# Patient Record
Sex: MALE | Race: WHITE | NOT HISPANIC OR LATINO | Employment: UNEMPLOYED | URBAN - METROPOLITAN AREA
[De-identification: names, ages, dates, MRNs, and addresses within clinical notes are randomized per-mention and may not be internally consistent; named-entity substitution may affect disease eponyms.]

---

## 2017-11-05 PROBLEM — I10 ESSENTIAL HYPERTENSION: Status: ACTIVE | Noted: 2017-11-05

## 2017-11-05 PROBLEM — Z00.00 HEALTHCARE MAINTENANCE: Status: ACTIVE | Noted: 2017-11-05

## 2018-02-05 PROBLEM — Z00.00 HEALTHCARE MAINTENANCE: Status: RESOLVED | Noted: 2017-11-05 | Resolved: 2018-02-05

## 2018-08-06 ENCOUNTER — TELEPHONE (OUTPATIENT)
Dept: SMOKING CESSATION | Facility: CLINIC | Age: 58
End: 2018-08-06

## 2018-08-06 ENCOUNTER — CLINICAL SUPPORT (OUTPATIENT)
Dept: SMOKING CESSATION | Facility: CLINIC | Age: 58
End: 2018-08-06
Payer: COMMERCIAL

## 2018-08-06 DIAGNOSIS — F17.210 MODERATE SMOKER (20 OR LESS PER DAY): Primary | ICD-10-CM

## 2018-08-06 PROCEDURE — 99404 PREV MED CNSL INDIV APPRX 60: CPT | Mod: S$GLB,,,

## 2018-08-06 RX ORDER — DIPHENHYDRAMINE HCL 25 MG
CAPSULE ORAL
Qty: 380 EACH | Refills: 0 | Status: SHIPPED | OUTPATIENT
Start: 2018-08-06 | End: 2018-08-14 | Stop reason: SDUPTHER

## 2018-08-06 NOTE — Clinical Note
Patient currently dipping 1/2 can per day and will begin 1:1 cessation therapy with CTTS. Patient will begin prescribed tobacco cessation medication regimen of 4mg nicotine lozenges as needed, up to 20/day, in place of loose dip.

## 2018-08-14 ENCOUNTER — CLINICAL SUPPORT (OUTPATIENT)
Dept: SMOKING CESSATION | Facility: CLINIC | Age: 58
End: 2018-08-14
Payer: COMMERCIAL

## 2018-08-14 DIAGNOSIS — F17.210 MODERATE SMOKER (20 OR LESS PER DAY): ICD-10-CM

## 2018-08-14 DIAGNOSIS — F17.200 NICOTINE DEPENDENCE: Primary | ICD-10-CM

## 2018-08-14 PROCEDURE — 99402 PREV MED CNSL INDIV APPRX 30: CPT | Mod: S$GLB,,,

## 2018-08-14 RX ORDER — DIPHENHYDRAMINE HCL 25 MG
CAPSULE ORAL
Qty: 380 EACH | Refills: 0 | Status: SHIPPED | OUTPATIENT
Start: 2018-08-14 | End: 2018-10-15 | Stop reason: SDUPTHER

## 2018-08-14 NOTE — Clinical Note
pt reports no longer dipping and doing well on 4mg nicotine gum; pt will be traveling out of country for next month and is excited about being tobacco free; pt will follow up in clinic upon return home

## 2018-08-14 NOTE — PROGRESS NOTES
Individual Follow-Up Form    8/14/2018    Quit Date: 8/7/18    Clinical Status of Patient: Outpatient    Length of Service: 30 minutes    Continuing Medication: yes  Nicotine gum    Other Medications:      Target Symptoms: Withdrawal and medication side effects. The following were  rated moderate (3) to severe (4) on TCRS:  · Moderate (3): none  · Severe (4): none    Comments: pt reports no longer dipping and doing well on 4mg nicotine gum; pt will be traveling out of country for next month and is excited about being tobacco free; pt will follow up in clinic upon return home    Diagnosis: F17.200    Next Visit: 9/20/18

## 2018-09-20 ENCOUNTER — CLINICAL SUPPORT (OUTPATIENT)
Dept: SMOKING CESSATION | Facility: CLINIC | Age: 58
End: 2018-09-20
Payer: COMMERCIAL

## 2018-09-20 DIAGNOSIS — F17.200 NICOTINE DEPENDENCE: Primary | ICD-10-CM

## 2018-09-20 PROCEDURE — 99402 PREV MED CNSL INDIV APPRX 30: CPT | Mod: S$GLB,,,

## 2018-09-20 NOTE — Clinical Note
pt remains tobacco free since 8/7/18; Patient remains on prescribed tobacco cessation medication regimen of 4mg gum without any negative side effects at this time, pt advised to keep track of how many pieces of nicotine gum he is averaging/day

## 2018-09-20 NOTE — PROGRESS NOTES
Individual Follow-Up Form    9/20/2018    Quit Date: 8/7/18    Clinical Status of Patient: Outpatient    Length of Service: 30 minutes    Continuing Medication: yes  Nicotine gum    Other Medications:      Target Symptoms: Withdrawal and medication side effects. The following were  rated moderate (3) to severe (4) on TCRS:  · Moderate (3): none  · Severe (4): none    Comments: pt remains tobacco free since 8/7/18; Patient remains on prescribed tobacco cessation medication regimen of 4mg gum without any negative side effects at this time, pt advised to keep track of how many pieces of nicotine gum he is averaging/day       Diagnosis: F17.200    Next Visit: 10/15/18

## 2018-10-15 ENCOUNTER — CLINICAL SUPPORT (OUTPATIENT)
Dept: SMOKING CESSATION | Facility: CLINIC | Age: 58
End: 2018-10-15
Payer: COMMERCIAL

## 2018-10-15 DIAGNOSIS — F17.210 MODERATE SMOKER (20 OR LESS PER DAY): ICD-10-CM

## 2018-10-15 DIAGNOSIS — F17.200 NICOTINE DEPENDENCE: Primary | ICD-10-CM

## 2018-10-15 PROCEDURE — 99402 PREV MED CNSL INDIV APPRX 30: CPT | Mod: S$GLB,,,

## 2018-10-15 RX ORDER — DIPHENHYDRAMINE HCL 25 MG
CAPSULE ORAL
Qty: 380 EACH | Refills: 0 | Status: SHIPPED | OUTPATIENT
Start: 2018-10-15 | End: 2018-11-12 | Stop reason: ALTCHOICE

## 2018-10-15 NOTE — Clinical Note
pt remains tobacco free since 8/7/18; Patient remains on prescribed tobacco cessation medication regimen of 4mg gum without any negative side effects at this time, pt reports using about 10-12 pieces/day

## 2018-10-15 NOTE — PROGRESS NOTES
Individual Follow-Up Form    10/15/2018    Quit Date: 8/17/18    Clinical Status of Patient: Outpatient    Length of Service: 30 minutes    Continuing Medication: yes  Nicotine gum    Other Medications:      Target Symptoms: Withdrawal and medication side effects. The following were  rated moderate (3) to severe (4) on TCRS:  · Moderate (3): none  · Severe (4): none    Comments: pt remains tobacco free since 8/7/18; Patient remains on prescribed tobacco cessation medication regimen of 4mg gum without any negative side effects at this time, pt reports using about 10-12 pieces/day    Diagnosis: F17.210    Next Visit: 4 weeks

## 2018-11-12 ENCOUNTER — CLINICAL SUPPORT (OUTPATIENT)
Dept: SMOKING CESSATION | Facility: CLINIC | Age: 58
End: 2018-11-12
Payer: COMMERCIAL

## 2018-11-12 DIAGNOSIS — F17.200 NICOTINE DEPENDENCE: Primary | ICD-10-CM

## 2018-11-12 PROCEDURE — 99402 PREV MED CNSL INDIV APPRX 30: CPT | Mod: S$GLB,,,

## 2018-11-12 RX ORDER — ASPIRIN/CALCIUM CARB/MAGNESIUM 325 MG
TABLET ORAL
Qty: 108 LOZENGE | Refills: 0 | Status: SHIPPED | OUTPATIENT
Start: 2018-11-12 | End: 2018-11-26 | Stop reason: ALTCHOICE

## 2018-11-12 NOTE — PROGRESS NOTES
Individual Follow-Up Form    11/12/2018    Quit Date: 8/7/18    Clinical Status of Patient: Outpatient    Length of Service: 30 minutes    Continuing Medication: yes  Nicotine Lozenges    Other Medications:      Target Symptoms: Withdrawal and medication side effects. The following were  rated moderate (3) to severe (4) on TCRS:  · Moderate (3): none  · Severe (4): none    Comments: pt remains tobacco free since 8/7/18; Patient remains on prescribed tobacco cessation medication regimen of 4mg lozenge without any negative side effects at this time, pt reports using about 10-12 pieces/day    Diagnosis: F17.200    Next Visit: 2 weeks

## 2018-11-12 NOTE — Clinical Note
pt remains tobacco free since 8/7/18; Patient remains on prescribed tobacco cessation medication regimen of 4mg lozenge without any negative side effects at this time, pt reports using about 10-12 pieces/day

## 2018-11-26 ENCOUNTER — CLINICAL SUPPORT (OUTPATIENT)
Dept: SMOKING CESSATION | Facility: CLINIC | Age: 58
End: 2018-11-26
Payer: COMMERCIAL

## 2018-11-26 DIAGNOSIS — F17.200 NICOTINE DEPENDENCE: Primary | ICD-10-CM

## 2018-11-26 PROCEDURE — 99402 PREV MED CNSL INDIV APPRX 30: CPT | Mod: S$GLB,,,

## 2018-11-26 RX ORDER — DIPHENHYDRAMINE HCL 25 MG
CAPSULE ORAL
Qty: 380 EACH | Refills: 0 | Status: SHIPPED | OUTPATIENT
Start: 2018-11-26 | End: 2018-11-28 | Stop reason: ALTCHOICE

## 2018-11-26 NOTE — PROGRESS NOTES
Individual Follow-Up Form    11/26/2018    Quit Date: 8/7/18    Clinical Status of Patient: Outpatient    Length of Service: 30 minutes    Continuing Medication: yes  Nicotine gum    Other Medications:      Target Symptoms: Withdrawal and medication side effects. The following were  rated moderate (3) to severe (4) on TCRS:  · Moderate (3): none  · Severe (4): none    Comments: pt remains tobacco free since 8/7/18; Patient will finish out remaining prescribed tobacco cessation medication regimen of 4mg lozenge without any negative side effects at this time, pt reports using about 10-12 pieces/day; pt is discharged from clinic, advised to contact clinic if cravings/desire increase    Diagnosis: F17.200    Next Visit: discharged from clinic

## 2018-11-28 ENCOUNTER — TELEPHONE (OUTPATIENT)
Dept: SMOKING CESSATION | Facility: CLINIC | Age: 58
End: 2018-11-28

## 2018-11-28 DIAGNOSIS — F17.200 NICOTINE DEPENDENCE: Primary | ICD-10-CM

## 2018-11-28 RX ORDER — ASPIRIN/CALCIUM CARB/MAGNESIUM 325 MG
TABLET ORAL
Qty: 108 LOZENGE | Refills: 0 | Status: SHIPPED | OUTPATIENT
Start: 2018-11-28 | End: 2020-08-19

## 2018-11-28 NOTE — TELEPHONE ENCOUNTER
Pharmacy states pt is maxed out on SCT benefits for nicotine gum, pt would like to switch to lozenges; lozenges refill sent to pts pharmacy

## 2018-12-10 ENCOUNTER — TELEPHONE (OUTPATIENT)
Dept: SMOKING CESSATION | Facility: CLINIC | Age: 58
End: 2018-12-10

## 2019-01-23 ENCOUNTER — TELEPHONE (OUTPATIENT)
Dept: SMOKING CESSATION | Facility: CLINIC | Age: 59
End: 2019-01-23

## 2019-02-08 ENCOUNTER — TELEPHONE (OUTPATIENT)
Dept: SMOKING CESSATION | Facility: CLINIC | Age: 59
End: 2019-02-08

## 2019-02-19 PROBLEM — M67.40 GANGLION CYST: Status: ACTIVE | Noted: 2019-02-19

## 2019-07-09 ENCOUNTER — PATIENT OUTREACH (OUTPATIENT)
Dept: ADMINISTRATIVE | Facility: HOSPITAL | Age: 59
End: 2019-07-09

## 2019-11-06 ENCOUNTER — TELEPHONE (OUTPATIENT)
Dept: ADMINISTRATIVE | Facility: HOSPITAL | Age: 59
End: 2019-11-06

## 2020-06-11 ENCOUNTER — PATIENT OUTREACH (OUTPATIENT)
Dept: ADMINISTRATIVE | Facility: HOSPITAL | Age: 60
End: 2020-06-11

## 2020-06-25 PROBLEM — Z01.84 ENCOUNTER FOR ANTIBODY RESPONSE EXAMINATION: Status: ACTIVE | Noted: 2020-06-25

## 2020-06-25 PROBLEM — R41.3 MEMORY LOSS OF UNKNOWN CAUSE: Status: ACTIVE | Noted: 2020-06-25

## 2020-06-25 PROBLEM — G56.01 CARPAL TUNNEL SYNDROME OF RIGHT WRIST: Status: ACTIVE | Noted: 2020-06-25

## 2020-08-19 ENCOUNTER — OFFICE VISIT (OUTPATIENT)
Dept: NEUROLOGY | Facility: CLINIC | Age: 60
End: 2020-08-19
Payer: MEDICAID

## 2020-08-19 VITALS
RESPIRATION RATE: 16 BRPM | WEIGHT: 213.63 LBS | TEMPERATURE: 99 F | HEART RATE: 84 BPM | HEIGHT: 65 IN | DIASTOLIC BLOOD PRESSURE: 82 MMHG | SYSTOLIC BLOOD PRESSURE: 144 MMHG | BODY MASS INDEX: 35.59 KG/M2 | OXYGEN SATURATION: 96 %

## 2020-08-19 DIAGNOSIS — R41.840 DIFFICULTY CONCENTRATING: ICD-10-CM

## 2020-08-19 DIAGNOSIS — Z82.49 FAMILY HISTORY OF BRAIN ANEURYSM: Primary | ICD-10-CM

## 2020-08-19 DIAGNOSIS — R41.3 POOR SHORT TERM MEMORY: ICD-10-CM

## 2020-08-19 DIAGNOSIS — R41.3 MEMORY LOSS OF UNKNOWN CAUSE: ICD-10-CM

## 2020-08-19 PROCEDURE — 99999 PR PBB SHADOW E&M-EST. PATIENT-LVL IV: CPT | Mod: PBBFAC,,, | Performed by: PHYSICIAN ASSISTANT

## 2020-08-19 PROCEDURE — 99214 OFFICE O/P EST MOD 30 MIN: CPT | Mod: PBBFAC | Performed by: PHYSICIAN ASSISTANT

## 2020-08-19 PROCEDURE — 99999 PR PBB SHADOW E&M-EST. PATIENT-LVL IV: ICD-10-PCS | Mod: PBBFAC,,, | Performed by: PHYSICIAN ASSISTANT

## 2020-08-19 PROCEDURE — 99204 PR OFFICE/OUTPT VISIT, NEW, LEVL IV, 45-59 MIN: ICD-10-PCS | Mod: S$PBB,,, | Performed by: PHYSICIAN ASSISTANT

## 2020-08-19 PROCEDURE — 99204 OFFICE O/P NEW MOD 45 MIN: CPT | Mod: S$PBB,,, | Performed by: PHYSICIAN ASSISTANT

## 2020-08-19 NOTE — PROGRESS NOTES
Subjective:       Patient ID: Eli Cha is a 59 y.o. male.    Chief Complaint: Memory Loss (over 30 years memory loss has gotten worse )    HPI  Eli Cha is a 59 y.o. male is here for initial visit. He is referred here for memory loss. He was in [place], and he suffered a severe heat stroke, in a coma for 2 days. 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 This eventually resulted in his discharge. He could not speak for about a year, but injections and steroids through ENT finally helped this issue. He soon began to notice that he had poor concentration, difficulty with retention of information. This has continued. He also had some short term memory problems, but he managed well. 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 He had to renew again recently, and he absolutely could not retain anything he studied.  This really concerned him. He had to get tutored to take the test. He took a year to prepare for it. This should have taken only one week.    He misplaces things constantly. He loses keys, wallet, phone. He forgets what he is looking for when he goes into another room. These are relatively new problems for him. He was previously very organized and not forgetful.    [place] 2 [place], and about 1 year ago, he got lost driving in [place]. Over the past year he has not gotten lost. He has noticed that he has difficulty staying on task and remembering where to stock things. His boss has noticed as well.  He has difficulty reading books where he use to be an avid reader. He cannot focus. He does not watch television except news.    His sister a year younger has 2 brain aneurysms. She has had surgery on one and the other inoperable. He is concerned that he may have brain aneurysm as well. He has one other sibling who has not been  evaluated for this    Patient had CT of head done in February of this year. It showed prominence of cortical sulci.    External Result Report   Narrative & Impression    EXAMINATION:  CT HEAD WITHOUT CONTRAST     CLINICAL HISTORY:  Headache, chronic, new features or neuro deficit;Headache and memory loss;     TECHNIQUE:  Axial images obtained of brain without contrast     COMPARISON:  None     FINDINGS:  Mild prominence of cortical sulci.  No intracranial space-occupying mass, mass effect or focal parenchymal abnormality is seen.     Mucosal thickening isolated anterior left ethmoid air cell, remaining visualized paranasal sinuses and mastoid air cells are clear.     Impression:     Mild prominence of cortical sulci with otherwise normal CT of head.        Electronically signed by: Domonique Lozada MD  Date:                                            02/06/2020  Time:                                           15:28    Encounter    View Encounter                   Review of Systems   Constitutional: Negative for appetite change and fever.   HENT: Negative for sore throat.    Eyes: Negative for visual disturbance.   Respiratory: Negative for cough and shortness of breath.    Cardiovascular: Negative for chest pain.   Gastrointestinal: Negative for nausea and vomiting.   Endocrine: Negative for cold intolerance and heat intolerance.   Genitourinary: Negative for difficulty urinating.   Musculoskeletal: Negative for arthralgias, back pain and neck pain.   Skin: Negative for rash.   Allergic/Immunologic: Negative for food allergies.   Neurological: Negative for dizziness, tremors, seizures, speech difficulty, weakness, numbness and headaches.   Hematological: Does not bruise/bleed easily.   Psychiatric/Behavioral: Positive for decreased concentration. Negative for agitation and sleep disturbance.        Fogetful, poor short term memory       Objective:      Neurologic Exam     Mental Status   Oriented to person, place, and  time.     Cranial Nerves     CN III, IV, VI   Pupils are equal, round, and reactive to light.    Gait, Coordination, and Reflexes     Gait  Gait: normal    Reflexes   Right brachioradialis: 2+  Left brachioradialis: 2+  Right biceps: 2+  Left biceps: 2+  Right triceps: 2+  Left triceps: 2+  Right patellar: 2+  Left patellar: 2+  Right achilles: 1+  Left achilles: 2+    Physical Exam  Constitutional:       General: He is not in acute distress.     Appearance: Normal appearance. He is well-developed. He is not diaphoretic.   HENT:      Head: Normocephalic and atraumatic.      Right Ear: External ear normal.      Left Ear: External ear normal.      Nose: Nose normal.   Eyes:      General: No scleral icterus.        Right eye: No discharge.         Left eye: No discharge.      Conjunctiva/sclera: Conjunctivae normal.      Pupils: Pupils are equal, round, and reactive to light.   Neck:      Musculoskeletal: Normal range of motion and neck supple.   Cardiovascular:      Rate and Rhythm: Normal rate and regular rhythm.      Heart sounds: Normal heart sounds.   Pulmonary:      Effort: Pulmonary effort is normal.      Breath sounds: Normal breath sounds.   Abdominal:      General: Bowel sounds are normal.      Palpations: Abdomen is soft.   Musculoskeletal: Normal range of motion.         General: No tenderness.   Skin:     General: Skin is warm and dry.   Neurological:      Mental Status: He is alert and oriented to person, place, and time.      Cranial Nerves: Cranial nerves are intact. No cranial nerve deficit.      Sensory: Sensation is intact.      Motor: Motor function is intact. No abnormal muscle tone.      Coordination: Coordination is intact. Coordination normal.      Gait: Gait is intact.      Deep Tendon Reflexes:      Reflex Scores:       Tricep reflexes are 2+ on the right side and 2+ on the left side.       Bicep reflexes are 2+ on the right side and 2+ on the left side.       Brachioradialis reflexes are 2+  on the right side and 2+ on the left side.       Patellar reflexes are 2+ on the right side and 2+ on the left side.       Achilles reflexes are 1+ on the right side and 2+ on the left side.     Comments: Normal muscle bulk and tone.    Full fund of knowledge    Loses train of thought during conversation several times during interview/exam   Psychiatric:         Mood and Affect: Mood normal.         Behavior: Behavior normal.         Thought Content: Thought content normal.         Assessment:       1. Family history of brain aneurysm    2. Memory loss of unknown cause    3. Difficulty concentrating    4. Poor short term memory        Plan:   Family history of brain aneurysm  -     MRI Brain Without Contrast; Future; Expected date: 08/19/2020  -     MRA Brain without contrast; Future; Expected date: 08/19/2020    Memory loss of unknown cause    Difficulty concentrating    Poor short term memory    Neuropsychological testing would be optimal, but with his insurance, I don't think we can get [facility]  [facility]  Will discuss furter at follow up appointment.     Discussed risks and benefits of potential treatment options at length as well as potential side effects of medication changes. Medications were not changed. Please refer to medication reconciliation report for details. Medication compliance discussed. Instructed to call clinic if concerned or to ED if emergency.    STACY Rosado

## 2020-08-19 NOTE — LETTER
August 19, 2020      Kory Kam Jr., MD  [address]  [facility] [address]           [facility]  [address] [facility] [address]  Phone: [phone]  Fax: [phone]          Patient: Eli Cha   MR Number: [id number]   YOB: 1960   Date of Visit: 8/19/2020       Dear Dr. Kory Kam Jr.:    Thank you for referring Eli Cha to me for evaluation. Attached you will find relevant portions of my assessment and plan of care.    If you have questions, please do not hesitate to call me. I look forward to following Eli Cha along with you.    Sincerely,    STACY Banda    Enclosure  CC:  No Recipients    If you would like to receive this communication electronically, please contact [email or web address] or [phone] to request more information on [employer] access.    For providers and/or their staff who would like to refer a patient to [facility], please contact us through our one-stop-shop provider referral line, [facility] , at [phone].    If you feel you have received this communication in error or would no longer like to receive these types of communications, please e-mail [email or web address]

## 2020-08-27 ENCOUNTER — HOSPITAL ENCOUNTER (OUTPATIENT)
Dept: RADIOLOGY | Facility: HOSPITAL | Age: 60
Discharge: HOME | End: 2020-08-27
Attending: PHYSICIAN ASSISTANT
Payer: MEDICAID

## 2020-08-27 DIAGNOSIS — Z82.49 FAMILY HISTORY OF BRAIN ANEURYSM: ICD-10-CM

## 2020-08-27 PROCEDURE — 70551 MRI BRAIN STEM W/O DYE: CPT | Mod: 26,,, | Performed by: RADIOLOGY

## 2020-08-27 PROCEDURE — 70544 MRA BRAIN WITHOUT CONTRAST: ICD-10-PCS | Mod: 26,59,, | Performed by: RADIOLOGY

## 2020-08-27 PROCEDURE — 70544 MR ANGIOGRAPHY HEAD W/O DYE: CPT | Mod: TC,59

## 2020-08-27 PROCEDURE — 70551 MRI BRAIN WITHOUT CONTRAST: ICD-10-PCS | Mod: 26,,, | Performed by: RADIOLOGY

## 2020-08-27 PROCEDURE — 70551 MRI BRAIN STEM W/O DYE: CPT | Mod: TC

## 2020-08-27 PROCEDURE — 70544 MR ANGIOGRAPHY HEAD W/O DYE: CPT | Mod: 26,59,, | Performed by: RADIOLOGY

## 2020-08-31 DIAGNOSIS — I67.1 CEREBRAL ANEURYSM, NONRUPTURED: ICD-10-CM

## 2020-08-31 DIAGNOSIS — R93.0 ABNORMAL MRI OF HEAD: Primary | ICD-10-CM

## 2020-09-03 ENCOUNTER — HOSPITAL ENCOUNTER (OUTPATIENT)
Dept: RADIOLOGY | Facility: HOSPITAL | Age: 60
Discharge: HOME | End: 2020-09-03
Attending: PHYSICIAN ASSISTANT
Payer: MEDICAID

## 2020-09-03 DIAGNOSIS — I67.1 CEREBRAL ANEURYSM, NONRUPTURED: ICD-10-CM

## 2020-09-03 DIAGNOSIS — R93.0 ABNORMAL MRI OF HEAD: ICD-10-CM

## 2020-09-03 PROCEDURE — 70496 CT ANGIOGRAPHY HEAD: CPT | Mod: 26,,, | Performed by: RADIOLOGY

## 2020-09-03 PROCEDURE — 70496 CT ANGIOGRAPHY HEAD: CPT | Mod: TC

## 2020-09-03 PROCEDURE — 25500020 PHARM REV CODE 255: Performed by: PHYSICIAN ASSISTANT

## 2020-09-03 PROCEDURE — 70496 CTA HEAD: ICD-10-PCS | Mod: 26,,, | Performed by: RADIOLOGY

## 2020-09-03 RX ADMIN — IOHEXOL 100 ML: 350 INJECTION, SOLUTION INTRAVENOUS at 10:09

## 2020-09-08 ENCOUNTER — TELEPHONE (OUTPATIENT)
Dept: NEUROLOGY | Facility: CLINIC | Age: 60
End: 2020-09-08

## 2020-09-08 DIAGNOSIS — I67.1 CEREBRAL ANEURYSM WITHOUT RUPTURE: Primary | ICD-10-CM

## 2020-09-08 NOTE — TELEPHONE ENCOUNTER
Pt returned call. He does not want to see anyone here until he sees his neurologist on 09/17. Will call back if he wants to schedule appt

## 2020-09-08 NOTE — TELEPHONE ENCOUNTER
----- Message from Sabino Beckman sent at 9/8/2020  1:18 PM CDT -----  Patient Requesting Sooner Appointment.     Reason for sooner appt.: Cerebral aneurysm without rupture  When is the first available appointment? None for the year  Communication Preference: Eli rinaldi/ [facility] @ [phone]  Additional Information: Pt added to wait list

## 2020-09-17 ENCOUNTER — OFFICE VISIT (OUTPATIENT)
Dept: NEUROLOGY | Facility: CLINIC | Age: 60
End: 2020-09-17
Payer: MEDICAID

## 2020-09-17 VITALS
HEART RATE: 70 BPM | WEIGHT: 213.88 LBS | HEIGHT: 65 IN | RESPIRATION RATE: 18 BRPM | DIASTOLIC BLOOD PRESSURE: 90 MMHG | TEMPERATURE: 98 F | BODY MASS INDEX: 35.63 KG/M2 | SYSTOLIC BLOOD PRESSURE: 158 MMHG

## 2020-09-17 DIAGNOSIS — G31.9: ICD-10-CM

## 2020-09-17 DIAGNOSIS — R93.89 ABNORMAL COMPUTED TOMOGRAPHY ANGIOGRAPHY (CTA): Primary | ICD-10-CM

## 2020-09-17 DIAGNOSIS — R41.9 COGNITIVE COMPLAINTS: ICD-10-CM

## 2020-09-17 DIAGNOSIS — I10 ESSENTIAL HYPERTENSION: ICD-10-CM

## 2020-09-17 PROCEDURE — 99214 OFFICE O/P EST MOD 30 MIN: CPT | Mod: S$PBB,,, | Performed by: PHYSICIAN ASSISTANT

## 2020-09-17 PROCEDURE — 99999 PR PBB SHADOW E&M-EST. PATIENT-LVL III: ICD-10-PCS | Mod: PBBFAC,,, | Performed by: PHYSICIAN ASSISTANT

## 2020-09-17 PROCEDURE — 99213 OFFICE O/P EST LOW 20 MIN: CPT | Mod: PBBFAC | Performed by: PHYSICIAN ASSISTANT

## 2020-09-17 PROCEDURE — 99214 PR OFFICE/OUTPT VISIT, EST, LEVL IV, 30-39 MIN: ICD-10-PCS | Mod: S$PBB,,, | Performed by: PHYSICIAN ASSISTANT

## 2020-09-17 PROCEDURE — 99999 PR PBB SHADOW E&M-EST. PATIENT-LVL III: CPT | Mod: PBBFAC,,, | Performed by: PHYSICIAN ASSISTANT

## 2020-09-17 RX ORDER — AMLODIPINE BESYLATE 5 MG/1
5 TABLET ORAL DAILY
Qty: 90 TABLET | Refills: 1 | Status: SHIPPED | OUTPATIENT
Start: 2020-09-17 | End: 2021-03-29 | Stop reason: SDUPTHER

## 2020-09-17 NOTE — PROGRESS NOTES
Subjective:       Patient ID: Eli Cha is a 60 y.o. male.    Chief Complaint: Follow-up    HPI  Eli Cha is a 60 y.o. male here for follow up visit. He was seen a few weeks ago initially for cognitive changes and for family history of cerebral aneurysm. MRI and MRA ordered and done.    He first noticed cognitive changes a few years ago. He does not feel changes are very progressive. He once became lost or at least disoriented [place] this [place]. Once since, this happened again while driving in [place]. He was able to collect his bearings and resolve the issue quickly and on his own both episodes. He has some trouble with short term memory as well. He manages. Long term memory intact. He denies daily episodes of memory impairment. He drives and lives alone without issue.    He has HTN which always improves with weight loss. He gained a good bit of weight during the current pandemic. His pressure has been jaz.  Today he is here not having slept from night shift last night. He is very exhausted today. MMSE was to be done today, but he states he is too tired to think. He would like this done at another time.    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 He was hospitalized and in a 'coma' for 2 days. He is not sure of sequelae. He wonders if he had structural damage from this.    Past Medical History:   Diagnosis Date    Ganglion cyst     Hypertension        Past Surgical History:   Procedure Laterality Date    SHOULDER SURGERY         Family History   Problem Relation Age of Onset    Diabetes Mother     Lymphoma Mother         Remission-stage 3    Cancer Father         Oral Cancer    COPD Father         lymph nodes    No Known Problems Sister     No Known Problems Brother        Social History     Socioeconomic History    Marital status: Single     Spouse name: Not on file    Number of children: Not on  file    Years of education: 9+ [employer]    Highest education level: GED or equivalent   Occupational History    Not on file   Social Needs    Financial resource strain: Not on file    Food insecurity     Worry: Not on file     Inability: Not on file    Transportation needs     Medical: Not on file     Non-medical: Not on file   Tobacco Use    Smoking status: Never Smoker    Smokeless tobacco: Former User     Types: Chew   Substance and Sexual Activity    Alcohol use: No    Drug use: No    Sexual activity: Not on file   Lifestyle    Physical activity     Days per week: Not on file     Minutes per session: Not on file    Stress: Not on file   Relationships    Social connections     Talks on phone: Not on file     Gets together: Not on file     Attends [employer] service: Not on file     Active member of club or organization: Not on file     Attends meetings of clubs or organizations: Not on file     Relationship status: Not on file   Other Topics Concern    Not on file   Social History Narrative    Not on file       Current Outpatient Medications   Medication Sig Dispense Refill    amLODIPine (NORVASC) 5 MG tablet Take 1 tablet (5 mg total) by mouth once daily. 90 tablet 1     No current facility-administered medications for this visit.        Review of patient's allergies indicates:   Allergen Reactions    Penicillins Shortness Of Breath    Naprosyn [naproxen] Palpitations     Palpitations       Review of Systems   Constitutional: Negative for appetite change and fever.   HENT: Negative for sore throat.    Eyes: Negative for visual disturbance.   Respiratory: Negative for cough and shortness of breath.    Cardiovascular: Negative for chest pain.   Gastrointestinal: Negative for nausea and vomiting.   Endocrine: Negative for cold intolerance and heat intolerance.   Genitourinary: Negative for difficulty urinating.   Musculoskeletal: Negative for arthralgias, back pain and neck pain.   Skin: Negative for  rash.   Allergic/Immunologic: Negative for food allergies.   Neurological: Negative for dizziness, tremors, seizures, speech difficulty, weakness, numbness and headaches.   Hematological: Does not bruise/bleed easily.   Psychiatric/Behavioral: Positive for decreased concentration. Negative for agitation and sleep disturbance.        Fogetful, poor short term memory       Objective:      Neurologic Exam     Mental Status   Oriented to person, place, and time.     Cranial Nerves     CN III, IV, VI   Pupils are equal, round, and reactive to light.    Gait, Coordination, and Reflexes     Gait  Gait: normal    Reflexes   Right brachioradialis: 2+  Left brachioradialis: 2+  Right biceps: 2+  Left biceps: 2+  Right triceps: 2+  Left triceps: 2+  Right patellar: 2+  Left patellar: 2+  Right achilles: 1+  Left achilles: 2+    Physical Exam  Constitutional:       General: He is not in acute distress.     Appearance: Normal appearance. He is well-developed. He is not diaphoretic.   HENT:      Head: Normocephalic and atraumatic.      Right Ear: External ear normal.      Left Ear: External ear normal.      Nose: Nose normal.   Eyes:      General: No scleral icterus.        Right eye: No discharge.         Left eye: No discharge.      Conjunctiva/sclera: Conjunctivae normal.      Pupils: Pupils are equal, round, and reactive to light.   Neck:      Musculoskeletal: Normal range of motion and neck supple.   Cardiovascular:      Rate and Rhythm: Normal rate and regular rhythm.      Heart sounds: Normal heart sounds.   Pulmonary:      Effort: Pulmonary effort is normal.      Breath sounds: Normal breath sounds.   Abdominal:      General: Bowel sounds are normal.      Palpations: Abdomen is soft.   Musculoskeletal: Normal range of motion.         General: No tenderness.   Skin:     General: Skin is warm and dry.   Neurological:      Mental Status: He is alert and oriented to person, place, and time.      Cranial Nerves: Cranial nerves  are intact. No cranial nerve deficit.      Sensory: Sensation is intact.      Motor: Motor function is intact. No abnormal muscle tone.      Coordination: Coordination is intact. Coordination normal.      Gait: Gait is intact.      Deep Tendon Reflexes:      Reflex Scores:       Tricep reflexes are 2+ on the right side and 2+ on the left side.       Bicep reflexes are 2+ on the right side and 2+ on the left side.       Brachioradialis reflexes are 2+ on the right side and 2+ on the left side.       Patellar reflexes are 2+ on the right side and 2+ on the left side.       Achilles reflexes are 1+ on the right side and 2+ on the left side.     Comments: Normal muscle bulk and tone.    Full fund of knowledge    Loses train of thought during conversation several times during interview/exam   Psychiatric:         Mood and Affect: Mood normal.         Behavior: Behavior normal.         Thought Content: Thought content normal.         MRI BRAIN     Impression:     Mild scattered T2/FLAIR signal abnormality throughout the supratentorial white matter in keeping chronic microvascular ischemic disease of a mild degree.     Bilateral parietal atrophy.     Focal outpouching at the origin of the left superior cortical branches of the middle cerebral artery which could represent a small infundibulum rather than an aneurysm.  Consider further evaluation with a CTA of the head.        Electronically signed by: Jennifer Sanchez MD  Date:                                            08/27/2020    MRA BRAIN  FINDINGS:  Intracranial Compartment:     Mild prominence of cortical sulci bilaterally, primarily in the bilateral parietal regions.  No extra-axial blood or fluid collections.     Few scattered punctate foci of T2/FLAIR signal abnormality in the supratentorial white matter in keeping with chronic microvascular ischemic disease of a mild degree.  No mass lesion, acute hemorrhage, edema, or acute infarct.     Skull/Extracranial Contents  (limited evaluation): Bone marrow signal intensity is normal.     MRA (Intracranial Arteries):There is a focal outpouching at the origin of superior cortical branches of the left middle cerebral artery that has a lobulated appearance measuring approximately 3.5 x 2.5 mm.     Impression:     Mild scattered T2/FLAIR signal abnormality throughout the supratentorial white matter in keeping chronic microvascular ischemic disease of a mild degree.     Bilateral parietal atrophy.     Focal outpouching at the origin of the left superior cortical branches of the middle cerebral artery which could represent a small infundibulum rather than an aneurysm.  Consider further evaluation with a CTA of the head.        Electronically signed by: Jennifer Sanchez MD  Date:                                            08/27/2020  Time:                                           11:13        CTA HEAD  Venous structures (limited evaluation): Normal.     Impression:     No acute abnormality. No high-grade stenosis or occlusion.     Posteriorly projecting outpouching measuring 2.5 x 3.3 mm from the left distal M1 segment concerning for an aneurysm.  Consider further evaluation with conventional angiography.        Electronically signed by: Jennifer Sanchez MD  Date:                                            09/03/2020  Time:                                           14:38          Assessment:       1. Abnormal computed tomography angiography (CTA)    2. Cognitive complaints    3. Frontoparietal cerebral atrophy    4. Essential hypertension        Plan:   Abnormal computed tomography angiography (CTA)  Likely small aneurysm.   Conventional angiography recommended.  Offered referral to vascular neurology/radiology  Patient refuses.  Will repeat CTA in one year, sooner if issues.    Cognitive complaints  Only 2 episodes of true disorientation.  Some short term memory issues.  Functioning well  Patient feels this is slow to progress.  Grandmother  with dementia in 6th and 7th decades    Frontoparietal cerebral atrophy  Consistent with patient's symptoms of cognitive decline.  Will follow.  MMSE deferred today. I will have him return in 3 months for MMSE and f/u    Essential hypertension  -     amLODIPine (NORVASC) 5 MG tablet; Take 1 tablet (5 mg total) by mouth once daily.  Dispense: 90 tablet; Refill: 1  He will monitor pressure at home. If he loses weight and BP better controlled, he will drop back to 2.5 mg daily.  He will see Dr. Kam for routine follow up.      Discussed risks and benefits of potential treatment options at length as well as potential side effects of medication changes. Medications were changed. Please refer to medication reconciliation report for details. Medication compliance discussed. Instructed to call clinic if concerned or to ED if emergency.    STACY Rosado

## 2020-10-07 PROBLEM — R00.0 TACHYCARDIA: Status: ACTIVE | Noted: 2020-10-07

## 2020-10-07 PROBLEM — I48.91 ATRIAL FIBRILLATION WITH RVR: Status: ACTIVE | Noted: 2020-10-07

## 2021-02-08 ENCOUNTER — OFFICE VISIT (OUTPATIENT)
Dept: NEUROLOGY | Facility: CLINIC | Age: 61
End: 2021-02-08
Payer: MEDICAID

## 2021-02-08 VITALS
RESPIRATION RATE: 18 BRPM | WEIGHT: 223.56 LBS | TEMPERATURE: 98 F | DIASTOLIC BLOOD PRESSURE: 88 MMHG | SYSTOLIC BLOOD PRESSURE: 142 MMHG | BODY MASS INDEX: 37.25 KG/M2 | HEIGHT: 65 IN | HEART RATE: 78 BPM

## 2021-02-08 DIAGNOSIS — I67.1 CEREBRAL ANEURYSM, NONRUPTURED: ICD-10-CM

## 2021-02-08 DIAGNOSIS — R41.9 COGNITIVE COMPLAINTS: Primary | ICD-10-CM

## 2021-02-08 DIAGNOSIS — R93.89 ABNORMAL COMPUTED TOMOGRAPHY ANGIOGRAPHY (CTA): ICD-10-CM

## 2021-02-08 PROBLEM — R41.3 POOR SHORT TERM MEMORY: Status: RESOLVED | Noted: 2020-08-19 | Resolved: 2021-02-08

## 2021-02-08 PROBLEM — R41.3 MEMORY LOSS OF UNKNOWN CAUSE: Status: RESOLVED | Noted: 2020-06-25 | Resolved: 2021-02-08

## 2021-02-08 PROCEDURE — 99214 PR OFFICE/OUTPT VISIT, EST, LEVL IV, 30-39 MIN: ICD-10-PCS | Mod: S$PBB,,, | Performed by: PHYSICIAN ASSISTANT

## 2021-02-08 PROCEDURE — 99999 PR PBB SHADOW E&M-EST. PATIENT-LVL III: ICD-10-PCS | Mod: PBBFAC,,, | Performed by: PHYSICIAN ASSISTANT

## 2021-02-08 PROCEDURE — 99214 OFFICE O/P EST MOD 30 MIN: CPT | Mod: S$PBB,,, | Performed by: PHYSICIAN ASSISTANT

## 2021-02-08 PROCEDURE — 99999 PR PBB SHADOW E&M-EST. PATIENT-LVL III: CPT | Mod: PBBFAC,,, | Performed by: PHYSICIAN ASSISTANT

## 2021-02-08 PROCEDURE — 99213 OFFICE O/P EST LOW 20 MIN: CPT | Mod: PBBFAC | Performed by: PHYSICIAN ASSISTANT

## 2021-05-19 PROBLEM — I21.3 STEMI (ST ELEVATION MYOCARDIAL INFARCTION): Status: ACTIVE | Noted: 2021-05-19

## 2021-08-26 ENCOUNTER — PATIENT OUTREACH (OUTPATIENT)
Dept: ADMINISTRATIVE | Facility: HOSPITAL | Age: 61
End: 2021-08-26

## 2021-09-23 PROBLEM — K02.9 INFECTED DENTAL CARIES: Status: ACTIVE | Noted: 2021-09-23

## 2021-09-23 PROBLEM — K04.7 INFECTED DENTAL CARIES: Status: ACTIVE | Noted: 2021-09-23

## 2022-01-15 DIAGNOSIS — U07.1 COVID-19 VIRUS DETECTED: ICD-10-CM

## 2022-07-11 ENCOUNTER — PATIENT MESSAGE (OUTPATIENT)
Dept: ADMINISTRATIVE | Facility: HOSPITAL | Age: 62
End: 2022-07-11
Payer: MEDICAID

## 2022-10-08 PROBLEM — E66.01 CLASS 3 OBESITY: Chronic | Status: ACTIVE | Noted: 2022-10-08

## 2022-10-08 PROBLEM — E66.813 CLASS 3 OBESITY: Chronic | Status: ACTIVE | Noted: 2022-10-08

## 2022-10-08 PROBLEM — I48.20 CHRONIC ATRIAL FIBRILLATION: Chronic | Status: ACTIVE | Noted: 2022-10-08

## 2022-10-08 PROBLEM — I20.9 ANGINA PECTORIS SYNDROME: Status: ACTIVE | Noted: 2022-10-08

## 2022-11-28 PROBLEM — I25.10 CAD (CORONARY ARTERY DISEASE): Status: ACTIVE | Noted: 2022-11-28

## 2022-11-28 PROBLEM — I50.9: Status: ACTIVE | Noted: 2022-11-28

## 2022-11-28 PROBLEM — I25.82 TOTAL OCCLUSION OF CORONARY ARTERY, CHRONIC: Status: ACTIVE | Noted: 2022-11-28

## 2022-11-28 PROBLEM — E78.5 DYSLIPIDEMIA: Status: ACTIVE | Noted: 2022-11-28

## 2022-11-28 PROBLEM — I25.5 ISCHEMIC CARDIOMYOPATHY: Status: ACTIVE | Noted: 2022-11-28

## 2022-11-28 PROBLEM — I48.0 PAROXYSMAL ATRIAL FIBRILLATION: Status: ACTIVE | Noted: 2022-11-28

## 2022-12-14 DIAGNOSIS — Z12.11 COLON CANCER SCREENING: ICD-10-CM

## 2023-03-15 ENCOUNTER — PATIENT MESSAGE (OUTPATIENT)
Dept: ADMINISTRATIVE | Facility: OTHER | Age: 63
End: 2023-03-15
Payer: MEDICAID

## 2023-03-17 ENCOUNTER — PATIENT MESSAGE (OUTPATIENT)
Dept: RESEARCH | Facility: HOSPITAL | Age: 63
End: 2023-03-17
Payer: MEDICAID

## 2023-05-03 ENCOUNTER — PATIENT MESSAGE (OUTPATIENT)
Dept: ADMINISTRATIVE | Facility: HOSPITAL | Age: 63
End: 2023-05-03
Payer: MEDICAID

## 2023-07-10 ENCOUNTER — PATIENT MESSAGE (OUTPATIENT)
Dept: ADMINISTRATIVE | Facility: HOSPITAL | Age: 63
End: 2023-07-10
Payer: MEDICAID

## 2023-07-19 PROBLEM — R19.5 POSITIVE COLORECTAL CANCER SCREENING USING COLOGUARD TEST: Status: ACTIVE | Noted: 2023-07-19

## 2023-09-26 PROBLEM — M54.12 CERVICAL RADICULOPATHY: Status: ACTIVE | Noted: 2023-09-26

## 2023-10-03 ENCOUNTER — PATIENT MESSAGE (OUTPATIENT)
Dept: ADMINISTRATIVE | Facility: OTHER | Age: 63
End: 2023-10-03
Payer: MEDICAID

## 2023-12-22 ENCOUNTER — PATIENT MESSAGE (OUTPATIENT)
Dept: ADMINISTRATIVE | Facility: OTHER | Age: 63
End: 2023-12-22
Payer: MEDICAID

## 2024-04-08 ENCOUNTER — PATIENT MESSAGE (OUTPATIENT)
Dept: ADMINISTRATIVE | Facility: OTHER | Age: 64
End: 2024-04-08
Payer: MEDICAID